# Patient Record
Sex: FEMALE | Race: WHITE | Employment: OTHER | URBAN - METROPOLITAN AREA
[De-identification: names, ages, dates, MRNs, and addresses within clinical notes are randomized per-mention and may not be internally consistent; named-entity substitution may affect disease eponyms.]

---

## 2017-01-09 ENCOUNTER — TELEPHONE (OUTPATIENT)
Dept: SURGERY | Facility: CLINIC | Age: 47
End: 2017-01-09

## 2017-01-09 ENCOUNTER — OFFICE VISIT (OUTPATIENT)
Dept: SURGERY | Facility: CLINIC | Age: 47
End: 2017-01-09

## 2017-01-09 VITALS
WEIGHT: 124 LBS | BODY MASS INDEX: 21 KG/M2 | DIASTOLIC BLOOD PRESSURE: 82 MMHG | SYSTOLIC BLOOD PRESSURE: 124 MMHG | HEART RATE: 100 BPM | RESPIRATION RATE: 16 BRPM

## 2017-01-09 DIAGNOSIS — G43.009 MIGRAINE WITHOUT AURA AND WITHOUT STATUS MIGRAINOSUS, NOT INTRACTABLE: ICD-10-CM

## 2017-01-09 DIAGNOSIS — G43.701 CHRONIC MIGRAINE WITHOUT AURA WITH STATUS MIGRAINOSUS, NOT INTRACTABLE: Primary | ICD-10-CM

## 2017-01-09 DIAGNOSIS — G43.829 MENSTRUAL MIGRAINE WITHOUT STATUS MIGRAINOSUS, NOT INTRACTABLE: ICD-10-CM

## 2017-01-09 PROCEDURE — 99214 OFFICE O/P EST MOD 30 MIN: CPT | Performed by: OTHER

## 2017-01-09 RX ORDER — SUMATRIPTAN 100 MG/1
100 TABLET, FILM COATED ORAL EVERY 2 HOUR PRN
Qty: 9 TABLET | Refills: 6 | Status: SHIPPED | OUTPATIENT
Start: 2017-01-09 | End: 2017-02-06

## 2017-01-09 NOTE — PROGRESS NOTES
SUBJECTIVE: Korey Maahraj is a 55year old female who presents for follow-up of Migraines. Says still has migraines. Says headaches are happening everyday now for past couple months.     Patient has been having Chronic Migraine with headaches lasting for 14- 24hrs. Patient has been having Chronic Migraine with headaches lasting for 14-15 days in a month and lasting for 4-6 hours a day and going on for more than 6 months.     Has tried multiple medications including, Topamax, Depakote, Elavil, Metoprolol

## 2017-01-09 NOTE — TELEPHONE ENCOUNTER
Please initiate a botox PA for patient. Botox Authorization/Reauthorization Questionnaire:      Year of initial migraine onset? 1980  Does patient have more than 15 headache days a month? yes   If yes, how many days monthly?   25  Do headaches last 4 h

## 2017-01-09 NOTE — PROGRESS NOTES
Pt here for migraine f/u. Pt reports only one migraine over the summer, but since winter arrived, pt has had a migraine at least 5 days per week. Pt is taking frova and imitrex, with naproxen. Pt here to discuss next steps.

## 2017-01-09 NOTE — PATIENT INSTRUCTIONS
Refill policies:    • Allow 2 business days for refills; controlled substances may take longer.   • Contact your pharmacy at least 5 days prior to running out of medication and have them send an electronic request or submit request through the “request re your physician has recommended that you have a procedure or additional testing performed. [facility] will contact your insurance carrier to obtain pre-certification or prior authorization.     Unfortunately, [facility] has seen an increas

## 2017-01-11 NOTE — TELEPHONE ENCOUNTER
Filled out [employer] form and fax with clinicals to [phone] for approval of code ,14969 for 4 visits every 3 months for 1 year

## 2017-01-18 NOTE — TELEPHONE ENCOUNTER
[facility] to determine when botox would be available, so that appt could be scheduled. Pharmacist stated that she has the botox, but the Rx is not going through insurance for payment.   She stated that she already contacted Cassie Becker re: this issue

## 2017-01-18 NOTE — TELEPHONE ENCOUNTER
Received authorization for Botox from [facility]    Approved for 1 treatment only from 1- through 4-. Patient would like me to call for an extension I told her I would call when she schedules her 2nd Botox appointment.     Reference # [id number]

## 2017-01-19 DIAGNOSIS — G43.701 CHRONIC MIGRAINE WITHOUT AURA WITH STATUS MIGRAINOSUS, NOT INTRACTABLE: Primary | ICD-10-CM

## 2017-01-19 RX ORDER — FROVATRIPTAN SUCCINATE 2.5 MG/1
TABLET, FILM COATED ORAL
Qty: 36 TABLET | Refills: 1 | Status: SHIPPED | OUTPATIENT
Start: 2017-01-19 | End: 2017-02-06

## 2017-01-19 NOTE — TELEPHONE ENCOUNTER
Spoke with patient to inform her that Botox has been ordered and that we will call her as soon as it arrives in the office to schedule her appointment. She verbalized understanding. Encouraged to call office with any other concerns.

## 2017-01-19 NOTE — TELEPHONE ENCOUNTER
Medication: Frovatriptan     Date of last refill: 3/25/16  [place]    Last office visit: 1/9/17  Due back to clinic per last office note: 2 months     Date next office visit scheduled: None scheduled     Last OV note

## 2017-01-24 NOTE — TELEPHONE ENCOUNTER
Called [facility] and spoke with Bessy Castro they are still in the processes of verify benefits under the patients major medical plan.

## 2017-01-24 NOTE — TELEPHONE ENCOUNTER
Grady Smith called from [facility] and can only extend the Botox authorization until 9- because patient only has a valid referral from her PCM for us until that date.     Patient would have to get a new referral from her [facility] for any visits after 9-27-17 the

## 2017-01-25 NOTE — TELEPHONE ENCOUNTER
Spoke to Celeste at [facility] she had [facility]  Tax LT#[id number] phone #[phone] to referral and has sent it to the nurse for review it will take 24-48 hours to approve then they will fax me a copy so I can fax it to  [address] [place] [address] fax #[phone].

## 2017-01-26 NOTE — TELEPHONE ENCOUNTER
Received new referral from [facility] with [facility] on the referral for the Botox  per [facility] request so they can dispense the medication. Fax to [phone]. Confirmed fax received.     Referral #[id number] for [facility] for the Botox

## 2017-01-27 NOTE — TELEPHONE ENCOUNTER
Per Savage Starkey at [address] they are trying to call patient for consent.  I called patient she will call them

## 2017-01-27 NOTE — TELEPHONE ENCOUNTER
Called [facility] and spoke with Breanna she said that she has to contact the intact team to verify everything. She sent up a tentative delivery for 1-3-17 to [facility] and also got a tentative consent from patient.  She will call the patient and me when every

## 2017-01-31 NOTE — TELEPHONE ENCOUNTER
Botox received. Lot# [id number] ; Exp date 09/2019.    Placed in [address] [place] [address], [address]

## 2017-02-01 ENCOUNTER — TELEPHONE (OUTPATIENT)
Dept: SURGERY | Facility: CLINIC | Age: 47
End: 2017-02-01

## 2017-02-01 DIAGNOSIS — G43.701 CHRONIC MIGRAINE WITHOUT AURA WITH STATUS MIGRAINOSUS, NOT INTRACTABLE: Primary | ICD-10-CM

## 2017-02-01 RX ORDER — METHYLPREDNISOLONE 4 MG/1
TABLET ORAL
Qty: 1 KIT | Refills: 0 | Status: SHIPPED | OUTPATIENT
Start: 2017-02-01 | End: 2017-05-05 | Stop reason: ALTCHOICE

## 2017-02-01 NOTE — TELEPHONE ENCOUNTER
Migraine- for the past 4 days, on the right side of head towards the eye and back of neck, no nausea or vomiting at this time, light and sound sensitivity     Patient states she is keeping hydrated and resting in dark room.  Patient tried taking her medicat

## 2017-02-06 ENCOUNTER — OFFICE VISIT (OUTPATIENT)
Dept: SURGERY | Facility: CLINIC | Age: 47
End: 2017-02-06

## 2017-02-06 VITALS
RESPIRATION RATE: 18 BRPM | WEIGHT: 125 LBS | HEART RATE: 96 BPM | DIASTOLIC BLOOD PRESSURE: 70 MMHG | BODY MASS INDEX: 21 KG/M2 | SYSTOLIC BLOOD PRESSURE: 110 MMHG

## 2017-02-06 DIAGNOSIS — G43.701 CHRONIC MIGRAINE WITHOUT AURA WITH STATUS MIGRAINOSUS, NOT INTRACTABLE: Primary | ICD-10-CM

## 2017-02-06 PROCEDURE — 64615 CHEMODENERV MUSC MIGRAINE: CPT | Performed by: OTHER

## 2017-02-06 PROCEDURE — 99213 OFFICE O/P EST LOW 20 MIN: CPT | Performed by: OTHER

## 2017-02-06 RX ORDER — FLUTICASONE PROPIONATE 50 MCG
SPRAY, SUSPENSION (ML) NASAL
COMMUNITY
Start: 2016-12-04 | End: 2017-07-10 | Stop reason: ALTCHOICE

## 2017-02-06 RX ORDER — SUMATRIPTAN 100 MG/1
100 TABLET, FILM COATED ORAL EVERY 2 HOUR PRN
Qty: 27 TABLET | Refills: 1 | Status: SHIPPED | OUTPATIENT
Start: 2017-02-06 | End: 2017-08-02

## 2017-02-06 RX ORDER — ERGOCALCIFEROL 1.25 MG/1
CAPSULE ORAL
COMMUNITY
Start: 2017-01-14 | End: 2017-05-05 | Stop reason: ALTCHOICE

## 2017-02-06 RX ORDER — FROVATRIPTAN SUCCINATE 2.5 MG/1
TABLET, FILM COATED ORAL
Qty: 36 TABLET | Refills: 2 | Status: SHIPPED | OUTPATIENT
Start: 2017-02-06 | End: 2017-08-02

## 2017-02-06 NOTE — PROCEDURES
BOTOX INJECTIONS FOR CHRONIC MIGRAINES:    After getting consent and under aseptic precautions Botox 155 Units were given in the head and neck muscles as per the FDA Approved Protocol for Chronic Migraine Headache.     10 U - Right and Left  ( 2 s

## 2017-02-06 NOTE — PATIENT INSTRUCTIONS
Refill policies:    • Allow 2 business days for refills; controlled substances may take longer.   • Contact your pharmacy at least 5 days prior to running out of medication and have them send an electronic request or submit request through the “request re your physician has recommended that you have a procedure or additional testing performed. [facility]) will contact your insurance carrier to obtain pre-certification or prior authorization.     Unfortunately, [facility] has seen an increas

## 2017-02-06 NOTE — PROGRESS NOTES
SUBJECTIVE: Queta Easton is a 55year old female who presents for follow-up of Migraines. Says still has migraines. Says contiues to have headaches couple times a week despite meds.     Patient has been having Chronic Migraine with headaches lasting for 14- patient,    Linsey Parra MD  Medical Director - [id number] [facility]  Board Certified in Neurology, Vascular Neurology(Stroke), Neurocritical Care and Headache Medicine.

## 2017-03-29 ENCOUNTER — TELEPHONE (OUTPATIENT)
Dept: NEUROLOGY | Facility: CLINIC | Age: 47
End: 2017-03-29

## 2017-03-29 DIAGNOSIS — G43.701 CHRONIC MIGRAINE WITHOUT AURA WITH STATUS MIGRAINOSUS, NOT INTRACTABLE: Primary | ICD-10-CM

## 2017-03-29 RX ORDER — BUTALBITAL, ACETAMINOPHEN AND CAFFEINE 300; 40; 50 MG/1; MG/1; MG/1
1 CAPSULE ORAL EVERY 6 HOURS PRN
Qty: 30 CAPSULE | Refills: 0 | Status: SHIPPED | OUTPATIENT
Start: 2017-03-29 | End: 2017-06-14

## 2017-03-29 NOTE — TELEPHONE ENCOUNTER
Medication: Fioricet    Date of last refill: 12/30/16  [place]    Last office visit: 2/6/17  Due back to clinic per last office note:  3 months  Date next office visit scheduled:  5/5/17 Botox with Dr Debbie Fry

## 2017-03-30 DIAGNOSIS — G43.701 CHRONIC MIGRAINE WITHOUT AURA WITH STATUS MIGRAINOSUS, NOT INTRACTABLE: Primary | ICD-10-CM

## 2017-03-30 RX ORDER — PROMETHAZINE HYDROCHLORIDE 25 MG/1
TABLET ORAL
Qty: 30 TABLET | Refills: 0 | Status: SHIPPED | OUTPATIENT
Start: 2017-03-30 | End: 2017-06-13

## 2017-03-30 NOTE — TELEPHONE ENCOUNTER
Medication: Phenergan    Date of last refill: 2/23/16  [place]    Last office visit: 2/6/17  Due back to clinic per last office note:  3 months  Date next office visit scheduled:  5/5/17 Dr Hoyle Duane note re

## 2017-04-25 ENCOUNTER — TELEPHONE (OUTPATIENT)
Dept: SURGERY | Facility: CLINIC | Age: 47
End: 2017-04-25

## 2017-04-25 NOTE — TELEPHONE ENCOUNTER
Transmission to pharmacy failed. Attempted to call Rx to [facility]. [facility] transferred call to [facility]. After 15 minutes on hold without speaking to a pharmacist, hung up without calling in Rx.

## 2017-05-05 ENCOUNTER — OFFICE VISIT (OUTPATIENT)
Dept: NEUROLOGY | Facility: CLINIC | Age: 47
End: 2017-05-05

## 2017-05-05 VITALS
BODY MASS INDEX: 20 KG/M2 | HEART RATE: 92 BPM | WEIGHT: 123 LBS | SYSTOLIC BLOOD PRESSURE: 108 MMHG | RESPIRATION RATE: 16 BRPM | DIASTOLIC BLOOD PRESSURE: 72 MMHG

## 2017-05-05 DIAGNOSIS — G43.701 CHRONIC MIGRAINE WITHOUT AURA WITH STATUS MIGRAINOSUS, NOT INTRACTABLE: Primary | ICD-10-CM

## 2017-05-05 PROCEDURE — 99213 OFFICE O/P EST LOW 20 MIN: CPT | Performed by: OTHER

## 2017-05-05 PROCEDURE — 64615 CHEMODENERV MUSC MIGRAINE: CPT | Performed by: OTHER

## 2017-05-05 RX ORDER — LEVOTHYROXINE SODIUM 0.1 MG/1
100 TABLET ORAL DAILY
Refills: 1 | COMMUNITY
Start: 2017-04-11

## 2017-05-05 NOTE — PROGRESS NOTES
Pt here for second Botox injection. She reports significant improvement in migraines since last injection. Still getting headaches, but usually able to control with OTC medications.

## 2017-05-05 NOTE — PROGRESS NOTES
HPI:    Patient ID: Alexandr Montemayor is a 55year old female. HPI  Janice Tonya is a 55year old female who presented for chronic migraine follow up and for botox injections. She has long standing history of migraines.  She received first botox injections in Feb an units to be administered by MD into multiple sites of head, neck and scalp every 3 months for chronic migraine prophylaxis. Disp: 200 Units Rfl: 3   docusate sodium (COLACE) 100 MG Oral Cap Take 1 capsule (100 mg total) by mouth 2 (two) times daily.  Hold i steady. ASSESSMENT/PLAN:   Chronic migraine without aura with status migrainosus, not intractable  (primary encounter diagnosis)    Responded very well to Botox . Continue Botox treatment. Continue Imitrex as needed.     Follow up in 12 weeks  See

## 2017-05-05 NOTE — PROCEDURES
PROCEDURE:   BOTOX injection for chronic migraine: Using alcohol prep, fixed site protocol performed.  Botox was reconstituted to the following concentration: 5 units per 0.1 ml.   Muscles, number of sites, units used and Side injected were as follows:

## 2017-05-05 NOTE — PATIENT INSTRUCTIONS
Refill policies:    • Allow 2 business days for refills; controlled substances may take longer.   • Contact your pharmacy at least 5 days prior to running out of medication and have them send an electronic request or submit request through the “request re insurance carrier to obtain pre-certification or prior authorization. Unfortunately, [facility] has seen an increase in denial of payment even though the procedure/test has been pre-certified.   You are strongly encouraged to contact your insurance carrier to v

## 2017-06-13 ENCOUNTER — TELEPHONE (OUTPATIENT)
Dept: NEUROLOGY | Facility: CLINIC | Age: 47
End: 2017-06-13

## 2017-06-13 DIAGNOSIS — G43.829 MENSTRUAL MIGRAINE WITHOUT STATUS MIGRAINOSUS, NOT INTRACTABLE: ICD-10-CM

## 2017-06-13 DIAGNOSIS — G43.009 MIGRAINE WITHOUT AURA AND WITHOUT STATUS MIGRAINOSUS, NOT INTRACTABLE: ICD-10-CM

## 2017-06-13 DIAGNOSIS — G43.701 CHRONIC MIGRAINE WITHOUT AURA WITH STATUS MIGRAINOSUS, NOT INTRACTABLE: Primary | ICD-10-CM

## 2017-06-14 DIAGNOSIS — G43.701 CHRONIC MIGRAINE WITHOUT AURA WITH STATUS MIGRAINOSUS, NOT INTRACTABLE: Primary | ICD-10-CM

## 2017-06-14 RX ORDER — PROMETHAZINE HYDROCHLORIDE 25 MG/1
TABLET ORAL
Qty: 30 TABLET | Refills: 0 | Status: SHIPPED | OUTPATIENT
Start: 2017-06-14 | End: 2017-08-02

## 2017-06-14 RX ORDER — BUTALBITAL, ACETAMINOPHEN AND CAFFEINE 300; 40; 50 MG/1; MG/1; MG/1
1 CAPSULE ORAL EVERY 6 HOURS PRN
Qty: 60 CAPSULE | Refills: 0 | Status: SHIPPED | OUTPATIENT
Start: 2017-06-14 | End: 2017-08-16

## 2017-06-14 NOTE — TELEPHONE ENCOUNTER
Medication: Promethazine     Date of last refill: 3/30/17  [place]    Last office visit: 5/5/17  Due back to clinic per last office note: 12 weeks   Date next office visit scheduled: 8/16/17    Last OV note recommenda

## 2017-06-14 NOTE — TELEPHONE ENCOUNTER
Medication: Fioricet     Date of last refill: 03/29/117  [place] noted    Last office visit: 5/5/2017  Due back to clinic per last office note:  12 weeks;  Botox  Date next office visit scheduled:  Future Appointments

## 2017-06-19 NOTE — TELEPHONE ENCOUNTER
Patient had problem with Fioricet from pharmacy. Pharmacy was sent RX for Fioricet capsules but pharmacy said they needed a call from provider to receive. Patient states problem has been resolved.

## 2017-07-10 ENCOUNTER — OFFICE VISIT (OUTPATIENT)
Dept: NEUROLOGY | Facility: CLINIC | Age: 47
End: 2017-07-10

## 2017-07-10 VITALS
HEART RATE: 76 BPM | DIASTOLIC BLOOD PRESSURE: 74 MMHG | WEIGHT: 124 LBS | RESPIRATION RATE: 18 BRPM | BODY MASS INDEX: 21 KG/M2 | SYSTOLIC BLOOD PRESSURE: 122 MMHG

## 2017-07-10 DIAGNOSIS — G43.911 INTRACTABLE MIGRAINE WITH STATUS MIGRAINOSUS, UNSPECIFIED MIGRAINE TYPE: Primary | ICD-10-CM

## 2017-07-10 PROCEDURE — 99213 OFFICE O/P EST LOW 20 MIN: CPT | Performed by: OTHER

## 2017-07-10 RX ORDER — PREDNISONE 20 MG/1
20 TABLET ORAL DAILY
Qty: 10 TABLET | Refills: 0 | Status: SHIPPED | OUTPATIENT
Start: 2017-07-10 | End: 2017-08-16 | Stop reason: ALTCHOICE

## 2017-07-10 NOTE — PROGRESS NOTES
Patient states improvement since last botox treatment. For the past week, migraines have returned.  No c/o of migraine at this time,

## 2017-07-10 NOTE — PATIENT INSTRUCTIONS
Refill policies:    • Allow 2-3 business days for refills; controlled substances may take longer.   • Contact your pharmacy at least 5 days prior to running out of medication and have them send an electronic request or submit request through the [facility] have a procedure or additional testing performed. [facility]) will contact your insurance carrier to obtain pre-certification or prior authorization.     Unfortunately, [facility] has seen an increase in denial of payment even though the p

## 2017-07-11 NOTE — PROGRESS NOTES
HPI:    Patient ID: Carlos Edwards is a 52year old female. HPI     Carlos Edwards is a 52year old female who presents with intractable headache. She states headaches started abruptly last week and is persistent.  She was busy with her son's graduation part OnabotulinumtoxinA (BOTOX) 200 UNITS Injection Recon Soln 155 units to be administered by MD into multiple sites of head, neck and scalp every 3 months for chronic migraine prophylaxis.  Disp: 200 Units Rfl: 3   docusate sodium (COLACE) 100 MG Oral Cap Ta bilaterally. Finger-to-nose coordination is intact. Gait brisk and steady. ASSESSMENT/PLAN:   (G43.911) Intractable migraine with status migrainosus, unspecified migraine type  (primary encounter diagnosis)    Start oral steroids.  If no relief w

## 2017-07-13 ENCOUNTER — TELEPHONE (OUTPATIENT)
Dept: NEUROLOGY | Facility: CLINIC | Age: 47
End: 2017-07-13

## 2017-07-13 NOTE — TELEPHONE ENCOUNTER
Spoke with patient and relayed approval for nerve block. She would like to come in as soon as possible for injection. Discussed with Dr. Siva Rodriguez. She is ok to open up her 1pm slot on her schedule for Friday, 7/14/17. Pt agreeable to that option.   Appo

## 2017-07-13 NOTE — TELEPHONE ENCOUNTER
Received approval from [facility])    Reference #[id number] FOR NERVE BLOCKS [id number] - 1 VISIT ONLY from 7/11/17 - 09/27/2017

## 2017-07-14 ENCOUNTER — OFFICE VISIT (OUTPATIENT)
Dept: NEUROLOGY | Facility: CLINIC | Age: 47
End: 2017-07-14

## 2017-07-14 VITALS
SYSTOLIC BLOOD PRESSURE: 110 MMHG | HEART RATE: 88 BPM | WEIGHT: 124 LBS | BODY MASS INDEX: 21 KG/M2 | DIASTOLIC BLOOD PRESSURE: 80 MMHG

## 2017-07-14 DIAGNOSIS — G43.019 INTRACTABLE MIGRAINE WITHOUT AURA AND WITHOUT STATUS MIGRAINOSUS: Primary | ICD-10-CM

## 2017-07-14 DIAGNOSIS — M54.81 OCCIPITAL NEURALGIA OF RIGHT SIDE: ICD-10-CM

## 2017-07-14 PROCEDURE — 64405 NJX AA&/STRD GR OCPL NRV: CPT | Performed by: OTHER

## 2017-07-14 PROCEDURE — 64400 NJX AA&/STRD TRIGEMINAL NRV: CPT | Performed by: OTHER

## 2017-07-14 RX ORDER — BUPIVACAINE HYDROCHLORIDE 5 MG/ML
3 INJECTION, SOLUTION EPIDURAL; INTRACAUDAL ONCE
Status: COMPLETED | OUTPATIENT
Start: 2017-07-14 | End: 2017-07-14

## 2017-07-14 RX ORDER — TRIAMCINOLONE ACETONIDE 40 MG/ML
60 INJECTION, SUSPENSION INTRA-ARTICULAR; INTRAMUSCULAR ONCE
Status: COMPLETED | OUTPATIENT
Start: 2017-07-14 | End: 2017-07-14

## 2017-07-14 NOTE — PATIENT INSTRUCTIONS
Refill policies:    • Allow 2-3 business days for refills; controlled substances may take longer.   • Contact your pharmacy at least 5 days prior to running out of medication and have them send an electronic request or submit request through the [facility] have a procedure or additional testing performed. [facility]) will contact your insurance carrier to obtain pre-certification or prior authorization.     Unfortunately, [facility] has seen an increase in denial of payment even though the p

## 2017-07-14 NOTE — PROCEDURES
Indication: Intractable migraine, supra-orbital pain, occipital neuralgia     Procedure: The areas are prepped and cleaned with betadine scrub and alcohol. Right auriculotemporal, supraorbital and occipital nerve block performed using a mixture of 4 ml 0.

## 2017-07-31 ENCOUNTER — TELEPHONE (OUTPATIENT)
Dept: SURGERY | Facility: CLINIC | Age: 47
End: 2017-07-31

## 2017-07-31 NOTE — TELEPHONE ENCOUNTER
Per [facility] rep Jamin Botox scheduled delivery is on 08/02/17 to [address]    Pt is appt is 08/16/17

## 2017-08-02 DIAGNOSIS — G43.701 CHRONIC MIGRAINE WITHOUT AURA WITH STATUS MIGRAINOSUS, NOT INTRACTABLE: ICD-10-CM

## 2017-08-02 DIAGNOSIS — G43.829 MENSTRUAL MIGRAINE WITHOUT STATUS MIGRAINOSUS, NOT INTRACTABLE: ICD-10-CM

## 2017-08-02 DIAGNOSIS — G43.009 MIGRAINE WITHOUT AURA AND WITHOUT STATUS MIGRAINOSUS, NOT INTRACTABLE: ICD-10-CM

## 2017-08-02 NOTE — TELEPHONE ENCOUNTER
Medication: Promethazine     Date of last refill: 6/14/17  [place]    Last office visit: 7/14/2017  Due back to clinic per last office note: None noted   Date next office visit scheduled:  Future Appointments  Date Ti

## 2017-08-02 NOTE — TELEPHONE ENCOUNTER
Medication: Frova and Sumatriptan     Date of last refill: 2/6/17  [place]    Last office visit: 7/14/17 with Dr Abdi Cruz for Nerve block Injections   Due back to clinic per last office note:  None noted   Date next of

## 2017-08-03 RX ORDER — PROMETHAZINE HYDROCHLORIDE 25 MG/1
TABLET ORAL
Qty: 30 TABLET | Refills: 0 | Status: SHIPPED | OUTPATIENT
Start: 2017-08-03 | End: 2017-12-04

## 2017-08-03 RX ORDER — FROVATRIPTAN SUCCINATE 2.5 MG/1
TABLET, FILM COATED ORAL
Qty: 36 TABLET | Refills: 0 | Status: SHIPPED | OUTPATIENT
Start: 2017-08-03 | End: 2017-10-16

## 2017-08-03 RX ORDER — SUMATRIPTAN 100 MG/1
TABLET, FILM COATED ORAL
Qty: 27 TABLET | Refills: 0 | Status: SHIPPED | OUTPATIENT
Start: 2017-08-03 | End: 2017-10-16

## 2017-08-16 ENCOUNTER — OFFICE VISIT (OUTPATIENT)
Dept: NEUROLOGY | Facility: CLINIC | Age: 47
End: 2017-08-16

## 2017-08-16 VITALS
RESPIRATION RATE: 16 BRPM | BODY MASS INDEX: 21 KG/M2 | HEART RATE: 88 BPM | WEIGHT: 124 LBS | DIASTOLIC BLOOD PRESSURE: 72 MMHG | SYSTOLIC BLOOD PRESSURE: 110 MMHG

## 2017-08-16 DIAGNOSIS — G43.701 CHRONIC MIGRAINE WITHOUT AURA WITH STATUS MIGRAINOSUS, NOT INTRACTABLE: ICD-10-CM

## 2017-08-16 PROCEDURE — 64615 CHEMODENERV MUSC MIGRAINE: CPT | Performed by: OTHER

## 2017-08-16 RX ORDER — BUTALBITAL, ACETAMINOPHEN AND CAFFEINE 300; 40; 50 MG/1; MG/1; MG/1
1 CAPSULE ORAL EVERY 6 HOURS PRN
Qty: 60 CAPSULE | Refills: 0 | Status: SHIPPED | OUTPATIENT
Start: 2017-08-16 | End: 2017-11-27

## 2017-08-16 NOTE — PATIENT INSTRUCTIONS
Refill policies:    • Allow 2-3 business days for refills; controlled substances may take longer.   • Contact your pharmacy at least 5 days prior to running out of medication and have them send an electronic request or submit request through the [facility] have a procedure or additional testing performed. [facility]) will contact your insurance carrier to obtain pre-certification or prior authorization.     Unfortunately, [facility] has seen an increase in denial of payment even though the p

## 2017-08-16 NOTE — PROGRESS NOTES
Pt here for third botox injection. Pt reports immense improvement, at least 75%, in frequency of headache. Also states that headaches are much less intense, and have improved by more than 50%. Pt having to use abortive medications much less.   Pt has had

## 2017-10-12 ENCOUNTER — TELEPHONE (OUTPATIENT)
Dept: SURGERY | Facility: CLINIC | Age: 47
End: 2017-10-12

## 2017-10-12 NOTE — TELEPHONE ENCOUNTER
Patients  referral  on 17 I called [facility] and spoke with Audery Boeck she said that before I can submit for any new Botox authorization the patient need to go to her PCP and get a new referral for our practice (non network provider continui

## 2017-10-16 DIAGNOSIS — G43.701 CHRONIC MIGRAINE WITHOUT AURA WITH STATUS MIGRAINOSUS, NOT INTRACTABLE: ICD-10-CM

## 2017-10-16 RX ORDER — FROVATRIPTAN SUCCINATE 2.5 MG/1
TABLET, FILM COATED ORAL
Qty: 36 TABLET | Refills: 0 | Status: SHIPPED | OUTPATIENT
Start: 2017-10-16 | End: 2018-05-02

## 2017-10-16 RX ORDER — SUMATRIPTAN 100 MG/1
TABLET, FILM COATED ORAL
Qty: 27 TABLET | Refills: 0 | Status: SHIPPED | OUTPATIENT
Start: 2017-10-16 | End: 2018-01-25

## 2017-10-16 NOTE — TELEPHONE ENCOUNTER
Medication: Frova and Sumatriptan     Date of last refill: 08/03/17  [place]a    Last office visit: 08/16/17  Due back to clinic per last office note:  12 weeks  Date next office visit scheduled:  11/27/17    Last OV

## 2017-10-17 NOTE — TELEPHONE ENCOUNTER
Patient called she went to her PCP yesterday 10-16-17 and they will put in the referral it might take 1 week to approve.  I explained I would call them next week to see if I get the botox referral.

## 2017-10-24 NOTE — TELEPHONE ENCOUNTER
Botox Reauthorization Questionnaire  1.  Has the patient experienced a reduction of at least 7 headache days or 100 hours per month since starting Botox? - Patient stated she had headaches for 4 days a week before Botox and now only have 3 bad headaches a m

## 2017-10-24 NOTE — TELEPHONE ENCOUNTER
Please call patient for re authorization for her Botox        Botox Reauthorization Questionnaire:  1. Has the patient experienced a reduction of at least 7 headache days or 100 hours per month since starting Botox?    a. If yes, by what percentage? 2.  Queta Collier

## 2017-10-30 ENCOUNTER — TELEPHONE (OUTPATIENT)
Dept: SURGERY | Facility: CLINIC | Age: 47
End: 2017-10-30

## 2017-10-30 DIAGNOSIS — G43.701 CHRONIC MIGRAINE WITHOUT AURA WITH STATUS MIGRAINOSUS, NOT INTRACTABLE: Primary | ICD-10-CM

## 2017-10-30 NOTE — TELEPHONE ENCOUNTER
Please send a prescription to [facility]:    Botox 200 units, 3 refills      Si units to be administered by MD into multiple sites of head, neck and scalp every 3 months for chronic migraine prophylaxis.

## 2017-11-01 NOTE — TELEPHONE ENCOUNTER
Spoke to Zahra Wagner at [employer] the Botox will be delivered to [place] on 11-7-17 confirmed address

## 2017-11-01 NOTE — TELEPHONE ENCOUNTER
Received approval for Botox for 4 visit but it expires 1/23/18. I called [facility] [phone] and was told that after the Nov 2017 visit I can call and get the referral extended.     Approval #[id number] -     Approval #[id number] - [id number]

## 2017-11-02 ENCOUNTER — TELEPHONE (OUTPATIENT)
Dept: NEUROLOGY | Facility: CLINIC | Age: 47
End: 2017-11-02

## 2017-11-07 NOTE — TELEPHONE ENCOUNTER
Received Botox in [address] in [place]. Lot [id number]    Exp Date:6/2020  Pt has Appt for Botox on 11-27-17. Botox given to RN.

## 2017-11-27 ENCOUNTER — OFFICE VISIT (OUTPATIENT)
Dept: NEUROLOGY | Facility: CLINIC | Age: 47
End: 2017-11-27

## 2017-11-27 VITALS
HEART RATE: 76 BPM | SYSTOLIC BLOOD PRESSURE: 116 MMHG | DIASTOLIC BLOOD PRESSURE: 72 MMHG | RESPIRATION RATE: 18 BRPM | BODY MASS INDEX: 21 KG/M2 | WEIGHT: 124 LBS

## 2017-11-27 DIAGNOSIS — G43.701 CHRONIC MIGRAINE WITHOUT AURA WITH STATUS MIGRAINOSUS, NOT INTRACTABLE: Primary | ICD-10-CM

## 2017-11-27 PROCEDURE — 99213 OFFICE O/P EST LOW 20 MIN: CPT | Performed by: OTHER

## 2017-11-27 PROCEDURE — 64615 CHEMODENERV MUSC MIGRAINE: CPT | Performed by: OTHER

## 2017-11-27 RX ORDER — BUTALBITAL, ACETAMINOPHEN AND CAFFEINE 300; 40; 50 MG/1; MG/1; MG/1
1 CAPSULE ORAL EVERY 6 HOURS PRN
Qty: 60 CAPSULE | Refills: 0 | Status: SHIPPED | OUTPATIENT
Start: 2017-11-27 | End: 2018-02-28

## 2017-11-27 NOTE — PATIENT INSTRUCTIONS
Refill policies:    • Allow 2-3 business days for refills; controlled substances may take longer.   • Contact your pharmacy at least 5 days prior to running out of medication and have them send an electronic request or submit request through the [facility] have a procedure or additional testing performed. [facility]) will contact your insurance carrier to obtain pre-certification or prior authorization.     Unfortunately, [facility] has seen an increase in denial of payment even though the p

## 2017-11-27 NOTE — PROGRESS NOTES
HPI:    Patient ID: Humaira Barone is a 52year old female. HPI       Edilberto Gong is a 52year old female who presents for follow up for chronic migraines. She is currently on Botox therapy for prevention.  States Botox is working great and she had only NEEDED FOR NAUSEA Disp: 30 tablet Rfl: 0   Levothyroxine Sodium 100 MCG Oral Tab Take 100 mcg by mouth daily.  Disp:  Rfl: 1   OnabotulinumtoxinA (BOTOX) 200 UNITS Injection Recon Soln 155 units to be administered by MD into multiple sites of head, neck and arm or leg weakness noted, strength approximately 5/5 bilaterally. Finger-to-nose coordination is intact. Gait brisk and steady.             ASSESSMENT/PLAN:   (G43.007) Chronic migraine without aura with status migrainosus, not intractable  (primary encoun

## 2017-12-04 DIAGNOSIS — G43.701 CHRONIC MIGRAINE WITHOUT AURA WITH STATUS MIGRAINOSUS, NOT INTRACTABLE: ICD-10-CM

## 2017-12-04 DIAGNOSIS — G43.829 MENSTRUAL MIGRAINE WITHOUT STATUS MIGRAINOSUS, NOT INTRACTABLE: ICD-10-CM

## 2017-12-04 DIAGNOSIS — G43.009 MIGRAINE WITHOUT AURA AND WITHOUT STATUS MIGRAINOSUS, NOT INTRACTABLE: ICD-10-CM

## 2017-12-04 RX ORDER — PROMETHAZINE HYDROCHLORIDE 25 MG/1
TABLET ORAL
Qty: 90 TABLET | Refills: 0 | Status: SHIPPED | OUTPATIENT
Start: 2017-12-04 | End: 2018-10-14

## 2017-12-04 NOTE — TELEPHONE ENCOUNTER
Medication: Promethazine    Date of last refill: 8/3/17 for #30/0 additional refills  [place]    Last office visit: 11/27/17  Due back to clinic per last office note:  12 weeks  Date next office visit scheduled:  3/9

## 2018-01-19 ENCOUNTER — TELEPHONE (OUTPATIENT)
Dept: NEUROLOGY | Facility: CLINIC | Age: 48
End: 2018-01-19

## 2018-01-19 RX ORDER — PREDNISONE 20 MG/1
20 TABLET ORAL DAILY
Qty: 10 TABLET | Refills: 0 | Status: SHIPPED | OUTPATIENT
Start: 2018-01-19 | End: 2018-03-09 | Stop reason: ALTCHOICE

## 2018-01-19 NOTE — TELEPHONE ENCOUNTER
S: Migraine since 12/23/17. B: LOV on 11/27/17, when she received Botox injections. Patient states migraines have been well-controlled with botox. Upcoming botox appointment on 03/09/18.     Taking medications as prescribed and states not overusing Imitr

## 2018-01-19 NOTE — TELEPHONE ENCOUNTER
Doctor signed prednisone orders. Relayed doctors recommendations to patient. Patient verbalized understanding and has no questions or concerns at this time.

## 2018-01-25 DIAGNOSIS — G43.701 CHRONIC MIGRAINE WITHOUT AURA WITH STATUS MIGRAINOSUS, NOT INTRACTABLE: ICD-10-CM

## 2018-01-26 RX ORDER — SUMATRIPTAN 100 MG/1
TABLET, FILM COATED ORAL
Qty: 27 TABLET | Refills: 0 | Status: SHIPPED | OUTPATIENT
Start: 2018-01-26 | End: 2018-05-21

## 2018-01-26 NOTE — TELEPHONE ENCOUNTER
Medication: Sumatriptan 100 mg     Date of last refill: 10/16/17  [place]    Last office visit: 11/27/2017  Due back to clinic per last office note: 12 weeks   Date next office visit scheduled:  Future Appointments  D

## 2018-02-15 ENCOUNTER — TELEPHONE (OUTPATIENT)
Dept: SURGERY | Facility: CLINIC | Age: 48
End: 2018-02-15

## 2018-02-15 NOTE — TELEPHONE ENCOUNTER
Botox Reauthorization Questionnaire:  1. Has the patient experienced a reduction of at least 7 headache days or 100 hours per month since starting Botox? Headaches have reduced to 3 a month  a. If yes, by what percentage? 80%  2.  Has the intensity and tay

## 2018-02-15 NOTE — TELEPHONE ENCOUNTER
Patient called and wanted me to know that the referral from her PCP was for 1 year starting 10/2018 she called her insurance and confirmed that it is valid until 10/25/2018. Referral #[id number].     Faxed new request to [address] fill

## 2018-02-15 NOTE — TELEPHONE ENCOUNTER
Please call patient and do re authorization information ASAP. Patients appointment for 3/9/18 might have to be rescheduled if we do not not get this information.

## 2018-02-21 NOTE — TELEPHONE ENCOUNTER
Received approval from [employer] [facility]) for Dr Govind Fletcher and Rebeca At [address] [place] [address].     Authorization #[id number] 4 visits from 3//3/18 - 8/29/18

## 2018-02-28 DIAGNOSIS — G43.701 CHRONIC MIGRAINE WITHOUT AURA WITH STATUS MIGRAINOSUS, NOT INTRACTABLE: ICD-10-CM

## 2018-02-28 RX ORDER — BUTALBITAL, ACETAMINOPHEN AND CAFFEINE 300; 40; 50 MG/1; MG/1; MG/1
CAPSULE ORAL
Qty: 60 CAPSULE | Refills: 0 | Status: SHIPPED
Start: 2018-02-28

## 2018-02-28 NOTE — TELEPHONE ENCOUNTER
Called [facility] and spoke with Shae Osullivan they need patient consent. She called patient and had to leave a message. I called patient and had to leave a message.  [phone] to give consent so we do not have to cancel her appointmen

## 2018-02-28 NOTE — TELEPHONE ENCOUNTER
Medication: Fioricet     Date of last refill: 11/27/17, #60 tabs, #0 refills   [place] None available     Last office visit: 11/27/2017 for Botox injections   Due back to clinic per last office note: 12 weeks   Date next

## 2018-03-01 NOTE — TELEPHONE ENCOUNTER
Rx for Fioricet printed and signed by Dr. Nohemi Steele. Faxed to preferred pharmacy at above number. Fax confirmation received.

## 2018-03-05 NOTE — TELEPHONE ENCOUNTER
Called [facility] and spoke with Mirta Bass she said that they need a valid RX she transferred me to the pharmacy I spoke with Dustin Mariscal the pharmacist and explained that a new RX was sent 10/30/17 with 3 refills he said he did not see it.  I gave him a refill over th

## 2018-03-06 NOTE — TELEPHONE ENCOUNTER
Called [facility] and spoke with Zackary Cooks she said that this has cleared insurance they have the authorization they just need clearance from the pharmacist give it about 3 hours and then they will call the patient or she can try to call.     I called patient and

## 2018-03-07 NOTE — TELEPHONE ENCOUNTER
Called [facility] and spoke with Desire she indicated that this was not ready to ship, I explained everything to her and she still insisted it is not ready she transferred me to the pharmacy end I was told this need a co-pay form and verification which will t

## 2018-03-07 NOTE — TELEPHONE ENCOUNTER
Pt called; said she spoke to [facility] [employer] and confirmed shipment for tomorrow (3/8/18) to the [facility] office.

## 2018-03-08 NOTE — TELEPHONE ENCOUNTER
Tracy called from [address] [place] [address] to set up delivery overnight to be received on 3/9/18 she said that the reason it was not shipped is because it was to late for a overnight the facility was in [place] they should of called the patient back when they found this

## 2018-03-08 NOTE — TELEPHONE ENCOUNTER
Called [facility] and was told that this was never sent out because the facility it was coming from was closed down because of weather.     I called patient and explained above she said she was told last night that it was going out of  [place] and they

## 2018-03-09 ENCOUNTER — OFFICE VISIT (OUTPATIENT)
Dept: NEUROLOGY | Facility: CLINIC | Age: 48
End: 2018-03-09

## 2018-03-09 VITALS
WEIGHT: 126 LBS | BODY MASS INDEX: 21 KG/M2 | SYSTOLIC BLOOD PRESSURE: 110 MMHG | HEART RATE: 86 BPM | DIASTOLIC BLOOD PRESSURE: 68 MMHG | RESPIRATION RATE: 16 BRPM

## 2018-03-09 DIAGNOSIS — G43.701 CHRONIC MIGRAINE WITHOUT AURA WITH STATUS MIGRAINOSUS, NOT INTRACTABLE: Primary | ICD-10-CM

## 2018-03-09 PROCEDURE — 64615 CHEMODENERV MUSC MIGRAINE: CPT | Performed by: OTHER

## 2018-03-09 PROCEDURE — 99213 OFFICE O/P EST LOW 20 MIN: CPT | Performed by: OTHER

## 2018-03-09 NOTE — PATIENT INSTRUCTIONS
Refill policies:    • Allow 2-3 business days for refills; controlled substances may take longer.   • Contact your pharmacy at least 5 days prior to running out of medication and have them send an electronic request or submit request through the [facility] recommended that you have a procedure or additional testing performed. [facility]) will contact your insurance carrier to obtain pre-certification or prior authorization.     Unfortunately, [facility] has seen an increase in denial of paym

## 2018-03-09 NOTE — TELEPHONE ENCOUNTER
Called [facility] and spoke with Kel the Botox is on the truck and arrive by noon, address confirmed. She said that even though they state morning delivery they can not guarantee that, but it is on the truck.

## 2018-03-09 NOTE — PROGRESS NOTES
Botox Reauthorization Questions:  1. Has the patient experienced a reduction of at least 7 headache days or 100 hours per month since starting Botox? yes  a. If yes, by what percentage? 75%  2.  Has the intensity and frequency of migraines decreased since

## 2018-03-09 NOTE — TELEPHONE ENCOUNTER
Botox arrived on 3/9/18 in [address] Lot [id number]. Exp 9/2020.   Pt has appt on 3/9/18 for botox with Dr. David Callahan

## 2018-04-19 ENCOUNTER — TELEPHONE (OUTPATIENT)
Dept: NEUROLOGY | Facility: CLINIC | Age: 48
End: 2018-04-19

## 2018-04-28 ENCOUNTER — TELEPHONE (OUTPATIENT)
Dept: NEUROLOGY | Facility: CLINIC | Age: 48
End: 2018-04-28

## 2018-04-28 RX ORDER — PREDNISONE 20 MG/1
20 TABLET ORAL DAILY
Qty: 10 TABLET | Refills: 0 | Status: SHIPPED | OUTPATIENT
Start: 2018-04-28 | End: 2018-06-29 | Stop reason: ALTCHOICE

## 2018-04-28 NOTE — TELEPHONE ENCOUNTER
Pt has prolonged migraine attack, requesting prednisone 20 mg for 7-10 days as it was helping in the past. I asked pt to call Dr Kaylyn Dwyer on Monday if not improving.

## 2018-05-02 DIAGNOSIS — G43.701 CHRONIC MIGRAINE WITHOUT AURA WITH STATUS MIGRAINOSUS, NOT INTRACTABLE: ICD-10-CM

## 2018-05-02 NOTE — TELEPHONE ENCOUNTER
Medication: Frova 2.5mg     Date of last refill: 10/16/17  [place]     Last office visit: 3/9/18  Due back to clinic per last office note:  12 weeks  Date next office visit scheduled:  6/29/18    Last OV note recommendat

## 2018-05-03 RX ORDER — FROVATRIPTAN SUCCINATE 2.5 MG/1
TABLET, FILM COATED ORAL
Qty: 9 TABLET | Refills: 0 | Status: SHIPPED | OUTPATIENT
Start: 2018-05-03

## 2018-05-21 DIAGNOSIS — G43.701 CHRONIC MIGRAINE WITHOUT AURA WITH STATUS MIGRAINOSUS, NOT INTRACTABLE: ICD-10-CM

## 2018-05-22 NOTE — TELEPHONE ENCOUNTER
Medication: Sumatriptan     Date of last refill: 1/26/18  [place]     Last office visit: 3/9/18  Due back to clinic per last office note:  12 weeks  Date next office visit scheduled:  6/29/18    Last OV note recommendati

## 2018-05-23 RX ORDER — SUMATRIPTAN 100 MG/1
TABLET, FILM COATED ORAL
Qty: 27 TABLET | Refills: 0 | Status: SHIPPED | OUTPATIENT
Start: 2018-05-23

## 2018-06-04 NOTE — TELEPHONE ENCOUNTER
Arranged for Delivery of Botox to be delivered on 6/26/2018  to [facility] [address]   200 [address] qty of 1. Patient has appointment on 6/28/2018.

## 2018-06-26 NOTE — TELEPHONE ENCOUNTER
Botox received 1  Vial  200 unit vial  NDC# [id number]  Exp 12/2020  Appointment scheduled on 6/29/2018 with Dr. Donn Clark @ [facility]

## 2018-06-29 ENCOUNTER — OFFICE VISIT (OUTPATIENT)
Dept: NEUROLOGY | Facility: CLINIC | Age: 48
End: 2018-06-29

## 2018-06-29 VITALS
DIASTOLIC BLOOD PRESSURE: 88 MMHG | HEART RATE: 84 BPM | SYSTOLIC BLOOD PRESSURE: 140 MMHG | BODY MASS INDEX: 21 KG/M2 | RESPIRATION RATE: 16 BRPM | WEIGHT: 125 LBS

## 2018-06-29 DIAGNOSIS — G43.701 CHRONIC MIGRAINE WITHOUT AURA WITH STATUS MIGRAINOSUS, NOT INTRACTABLE: Primary | ICD-10-CM

## 2018-06-29 PROCEDURE — 99213 OFFICE O/P EST LOW 20 MIN: CPT | Performed by: OTHER

## 2018-06-29 PROCEDURE — 64615 CHEMODENERV MUSC MIGRAINE: CPT | Performed by: OTHER

## 2018-06-29 NOTE — PROGRESS NOTES
Botox Reauthorization Questions:  1. Has the patient experienced a reduction of at least 7 headache days or 100 hours per month since starting Botox? yes  a. If yes, by what percentage? 85%  2.  Has the intensity and frequency of migraines decreased since

## 2018-06-29 NOTE — PATIENT INSTRUCTIONS
Refill policies:    • Allow 2-3 business days for refills; controlled substances may take longer.   • Contact your pharmacy at least 5 days prior to running out of medication and have them send an electronic request or submit request through the “request re entire amount billed. Precertification and Prior Authorizations: If your physician has recommended that you have a procedure or additional testing performed.   [facility]) will contact your insurance carrier to obtain pre-certi

## 2018-06-29 NOTE — PROGRESS NOTES
HPI:    Patient ID: Lilia Akhtar is a 50year old female. HPI       Leonel Harper is a 52year old female who presents for follow up for chronic migraines. She is currently on Botox therapy for prevention.  States Botox is working well but last month ha docusate sodium (COLACE) 100 MG Oral Cap Take 1 capsule (100 mg total) by mouth 2 (two) times daily. Hold if diarrhea develops. (Patient taking differently: Take 200 mg by mouth 2 (two) times daily.  Hold if diarrhea develops. ) Disp: 60 capsule Rfl: 3 Discussed briefly  She will be soon moving to [place] and may pursue the treatment there. Continue current management. Botox injection today. RTC as needed    See orders and medications filed with this encounter. Risk and benefits discussed.  The chandrika

## 2018-07-12 ENCOUNTER — TELEPHONE (OUTPATIENT)
Dept: NEUROLOGY | Facility: CLINIC | Age: 48
End: 2018-07-12

## 2018-07-12 DIAGNOSIS — G43.701 CHRONIC MIGRAINE WITHOUT AURA WITH STATUS MIGRAINOSUS, NOT INTRACTABLE: Primary | ICD-10-CM

## 2018-07-12 NOTE — TELEPHONE ENCOUNTER
Called [facility] [phone] gave all information to [address] received from 6/12/18 - 11/9/2018 ONLY    Approval #[id number]

## 2018-07-12 NOTE — TELEPHONE ENCOUNTER
Patient to start [address] [place] [address] for migraines. While in office, patient completed an [employer] questionnaire. Service Request Form was completed and signed by patient and doctor. Rx benefits confirmed with patient.   RN demonstrated injection technique to patient

## 2018-07-26 NOTE — TELEPHONE ENCOUNTER
Attempted to call both preferred numbers, pt did not  and could not LMTCB. Per below, approved through 11/9/18. Start form previously faxed for 2 months of free medication, so pt should be receiving soon if not already.

## 2018-08-03 NOTE — TELEPHONE ENCOUNTER
Rec'd incoming fax from [facility] indicating that Orase 98 was not complete. It requires a signature date and printed patient name on the patient signature line. Updated SRF and faxed back to [facility] with fax confirmation rec'd.

## 2018-08-24 NOTE — TELEPHONE ENCOUNTER
Patient called back. Relayed below. She stated she did not receive free trials yet. Given numbers for [facility] and [facility] to check on status of medication. Patient verbalized understanding and appreciative of call.

## 2018-09-12 ENCOUNTER — TELEPHONE (OUTPATIENT)
Dept: SURGERY | Facility: CLINIC | Age: 48
End: 2018-09-12

## 2018-09-12 NOTE — TELEPHONE ENCOUNTER
Patient has moved to [place] and is establishing with a new provider there. No need to reauthorize the Botox through [facility].

## 2018-09-12 NOTE — TELEPHONE ENCOUNTER
Patient needs new authorization for her 10/8/18 Botox.  Please call patient with reauthorization questions

## 2018-09-20 ENCOUNTER — TELEPHONE (OUTPATIENT)
Dept: NEUROLOGY | Facility: CLINIC | Age: 48
End: 2018-09-20

## 2018-10-14 DIAGNOSIS — G43.009 MIGRAINE WITHOUT AURA AND WITHOUT STATUS MIGRAINOSUS, NOT INTRACTABLE: ICD-10-CM

## 2018-10-14 DIAGNOSIS — G43.701 CHRONIC MIGRAINE WITHOUT AURA WITH STATUS MIGRAINOSUS, NOT INTRACTABLE: ICD-10-CM

## 2018-10-14 DIAGNOSIS — G43.829 MENSTRUAL MIGRAINE WITHOUT STATUS MIGRAINOSUS, NOT INTRACTABLE: ICD-10-CM

## 2018-10-15 RX ORDER — PROMETHAZINE HYDROCHLORIDE 25 MG/1
TABLET ORAL
Qty: 90 TABLET | Refills: 0 | Status: SHIPPED | OUTPATIENT
Start: 2018-10-15

## 2023-08-17 NOTE — TELEPHONE ENCOUNTER
Called [facility] and spoke with Taylor Mckeon to extend this referral for 1 year until 4-.  He sent it to nursing they will call me within 48 hours to let me know if they will extend the referral.     Patient Botox needs to be  Dispensed from [facility] V-Y Flap Text: The defect edges were debeveled with a #15 scalpel blade.  Given the location of the defect, shape of the defect and the proximity to free margins a V-Y flap was deemed most appropriate.  Using a sterile surgical marker, an appropriate advancement flap was drawn incorporating the defect and placing the expected incisions within the relaxed skin tension lines where possible.    The area thus outlined was incised deep to adipose tissue with a #15 scalpel blade.  The skin margins were undermined to an appropriate distance in all directions utilizing iris scissors.

## (undated) NOTE — MR AVS SNAPSHOT
[address], [address] [place] [address] [phone]               Thank you for choosing us for your health care visit with Gilford Prime, MD.  We are glad to serve you and happy to provide you with this s ? Patient must present photo ID at time of . If a designated family member will be picking up prescription, office must be given name of individual in advance and they must present an ID as well. ?  The name of the person picking up your prescripti Butalbital-APAP-Caffeine -40 MG Caps   Take 1 capsule by mouth every 6 (six) hours as needed for Pain. Commonly known as:  FIORICET           Cyproheptadine HCl 4 MG Tabs   Take 1 tablet by mouth 2 (two) times daily.    What changed:  when to take [employer] will allow you to access patient instructions from your recent visit,  view other health information, and more. To sign up or find more information, go to [email or web address] and click on the Sign Up Now link in the [employer] box.      Enter

## (undated) NOTE — MR AVS SNAPSHOT
[address], [address] [place] [address] [phone]               Thank you for choosing us for your health care visit with Alhaji Conway MD.  We are glad to serve you and happy to provide you with this s ? Patient must present photo ID at time of . If a designated family member will be picking up prescription, office must be given name of individual in advance and they must present an ID as well. ?  The name of the person picking up your prescripti This list is accurate as of: 1/9/17  2:10 PM.  Always use your most recent med list.                Butalbital-APAP-Caffeine -40 MG Caps   Take 1 capsule by mouth every 6 (six) hours as needed for Pain.    Commonly known as:  FIORICET           Devh Referral Order Referred to Address [facility] Phone Visits Status Diagnosis                 MyChart     Sign up for [employer], your secure online medical record.   [employer] will allow you to access patient instructions from your recent visit,  view other health inf

## (undated) NOTE — MR AVS SNAPSHOT
[address] [phone]               Thank you for choosing us for your health care visit with Jenelle Coleman MD.  We are glad to serve you and happy to provide you with this ? If your prescription is due for a refill, you may be due for a follow up appointment. ? To best provide you care, patients receiving routine medications need to be seen at least once a year.      protocol for controlled substances:  Written prescr Seasonal                 Today's Vital Signs     BP Pulse Weight             108/72 mmHg 92 123 lb            Current Medications          This list is accurate as of: 5/5/17 11:47 AM.  Always use your most recent med list.                Butalbital-APAP- Sign up for [employer], your secure online medical record. [employer] will allow you to access patient instructions from your recent visit,  view other health information, and more. To sign up or find more information, go to [email or web address] cl